# Patient Record
Sex: FEMALE | Race: WHITE | NOT HISPANIC OR LATINO | Employment: FULL TIME | URBAN - METROPOLITAN AREA
[De-identification: names, ages, dates, MRNs, and addresses within clinical notes are randomized per-mention and may not be internally consistent; named-entity substitution may affect disease eponyms.]

---

## 2019-10-30 ENCOUNTER — EVALUATION (OUTPATIENT)
Dept: PHYSICAL THERAPY | Facility: MEDICAL CENTER | Age: 54
End: 2019-10-30
Payer: COMMERCIAL

## 2019-10-30 DIAGNOSIS — G89.29 CHRONIC RIGHT SHOULDER PAIN: Primary | ICD-10-CM

## 2019-10-30 DIAGNOSIS — M25.511 CHRONIC RIGHT SHOULDER PAIN: Primary | ICD-10-CM

## 2019-10-30 PROCEDURE — 97161 PT EVAL LOW COMPLEX 20 MIN: CPT | Performed by: PHYSICAL THERAPIST

## 2019-10-30 PROCEDURE — 97110 THERAPEUTIC EXERCISES: CPT | Performed by: PHYSICAL THERAPIST

## 2019-10-30 NOTE — PROGRESS NOTES
PT Evaluation     Today's date: 10/30/2019  Patient name: Migdalia Penny  : 1965  MRN: [id number]  Referring provider: Richie Ray*  Dx:   Encounter Diagnosis     ICD-10-CM    1  Chronic right shoulder pain M25 [id number]     G89 [id number]                   Assessment  Assessment details: Migdalia Penny is a 47 y o  female who came to outpatient PT on 10/30/19  Patient presents with complaints of R shoulder pain  The pain began about year ago and it has been "on and off since " The patient's greatest concerns are  the pain randell is experiencing, worry over not knowing what's wrong and fear of not being able to keep active  No further referral appears necessary at this time based upon examination results  Migdalia Penny has the impairments listed below resulting in functional impairment and are having a negative impact on her quality of life  Patient is appropriate and would benefit from skilled PT intervention to have an optimal return to function and achieve patient specific goals  No Red Flags  All of the patient's questions were answered to their satisfaction and the patient agreed to the plan of care  Patient's greatest impairments are:  1 ) hypomobility of GHJ - to be addressed with joint mobilizations  2 ) hypomobility of thoracic spine - to be addressed with joint mobilizations and joint manipulations  3 ) soft tissue restrictions of the scapular musculature - to be addressed with self and therapist stretching and soft tissue mobilizations  Primary movement impairment diagnosis of hypomobility of glenohumeral joint accessory motion and thoracic spine resulting in pathoanatomical symptoms of The encounter diagnosis of Chronic right shoulder pain and limiting her ability to sit at work for extended periods of time  Etiologic factors include poor ergonomics and poor posture while sitting for extended periods of time    Impairments: abnormal muscle tone, abnormal or restricted ROM, lacks appropriate home exercise program and poor posture     Symptom irritability: lowUnderstanding of Dx/Px/POC: good   Prognosis: good    Goals  Patient will successfully transition to HEP  Patient will be able to manage symptoms independently  Patient will have postural awareness while sitting at work  Patient will improve mobility of thoracic spine  Patient will improve mobility of posterior capsule of GHJ  Patient will report no limitations in ADL's  Plan  Plan details: 1 time per week for 8 weeks   Patient would benefit from: skilled physical therapy  Referral necessary: No  Planned modality interventions: thermotherapy: hydrocollator packs  Planned therapy interventions: home exercise program, therapeutic exercise, therapeutic activities, stretching, postural training, patient education, neuromuscular re-education, manual therapy and joint mobilization  Treatment plan discussed with: patient        Subjective Evaluation    History of Present Illness  Mechanism of injury: Daniel Boyd is a 47 y o  female who came to outpatient PT on 10/30/19  Patient presents with complaints of R shoulder pain  The pain began about year ago and it has been "on and off since " She was prescribed an anti-inflammatory for the past 10 days that she reported seemed to help cam down the pain (today was the 11th day since she had been prescribed)  The patient's greatest concerns are  the pain she is experiencing, worry over not knowing what's wrong and fear of not being able to keep active  Work: desk job    Location: between spine and medial border of scapula     Denied any [employer]  Patient finds aggravating factors are:  1 ) sitting at desk at work for ling periods of time  2 ) sitting down watching tv    Patients finds most relieving factors are:  1 ) "rubbing her shoulder out"    No further referral appears necessary at this time based upon examination results      Patient's goals for therapy: decrease pain  Patient's goals: be able to get back to being active     Pain  Current pain ratin  At best pain ratin  At worst pain ratin  Quality: dull ache  Aggravating factors: sitting    Patient Goals  Patient goal: decrease pain        Objective     Postural Observations    Additional Postural Observation Details  Forward head posture, rounded shoulders, thoracic kyphosis    Palpation     Right   Tenderness of the levator scapulae, rhomboids and upper trapezius  Cervical/Thoracic Screen   Cervical range of motion within normal limits with the following exceptions: WNL except for mod limited lateral bend b/l  Thoracic range of motion within normal limits with the following exceptions: hypomobility throughout thoracic spine     Active Range of Motion   Left Shoulder   Normal active range of motion    Right Shoulder   Normal active range of motion    Passive Range of Motion   Left Shoulder   Normal passive range of motion    Right Shoulder   Normal passive range of motion    Scapular Mobility   Left Shoulder   Scapular mobility: good    Right Shoulder   Scapular mobility: fair    Joint Play   Left Shoulder  Hypomobile in the posterior capsule and thoracic spine  Right Shoulder  Hypomobile in the posterior capsule and thoracic spine  Additional Joint Play Details  Hypomobility of Right posterior capsule is worse than Left    Strength/Myotome Testing     Left Shoulder     Planes of Motion   Flexion: 4-   Abduction: 4     Isolated Muscles   Biceps: 4   Lower trapezius: 4   Middle trapezius: 4   Rhomboids: 4+   Triceps: 4-     Right Shoulder     Planes of Motion   Flexion: 4-   Abduction: 4     Isolated Muscles   Biceps: 4   Lower trapezius: 4-   Middle trapezius: 4   Rhomboids: 4   Triceps: 4-              Precautions: N/A      Manual              Seated CTJ Distraction Gr   V                                                                     Exercise Diary              Thoracic Ext over 1/ bolster STM to UT/LS             Post Capsule Stretch             Scapular Retraction                                                                                                                                                                                                                                 Modalities